# Patient Record
Sex: MALE | Race: WHITE
[De-identification: names, ages, dates, MRNs, and addresses within clinical notes are randomized per-mention and may not be internally consistent; named-entity substitution may affect disease eponyms.]

---

## 2022-09-12 ENCOUNTER — HOSPITAL ENCOUNTER (OUTPATIENT)
Dept: HOSPITAL 47 - RADMRIMAIN | Age: 27
Discharge: HOME | End: 2022-09-12
Attending: FAMILY MEDICINE
Payer: COMMERCIAL

## 2022-09-12 DIAGNOSIS — M25.512: Primary | ICD-10-CM

## 2022-09-13 NOTE — MR
EXAMINATION TYPE: MR shoulder LT wo con

 

DATE OF EXAM: 9/12/2022

 

COMPARISON: None

 

HISTORY: LEFT SHOULDER PAIN

 

Multiplanar multiecho imaging of the left shoulder performed with no contrast.

 

 

 

The supraspinatus tendon is intact. The infraspinatus tendon is intact. AC joint is intact. No subacr
omial impingement. Glenoid bunny appear intact. Subscapularis tendon is intact. The biceps tendon kandace
ears normal. No evidence of a fracture.

 

No evidence of a soft tissue mass. No fracture.

 

IMPRESSION:

MR scan of the left shoulder is within normal limits. No evidence of rotator cuff tear.